# Patient Record
Sex: MALE
[De-identification: names, ages, dates, MRNs, and addresses within clinical notes are randomized per-mention and may not be internally consistent; named-entity substitution may affect disease eponyms.]

---

## 2018-06-21 ENCOUNTER — HOSPITAL ENCOUNTER (EMERGENCY)
Dept: HOSPITAL 5 - ED | Age: 48
Discharge: HOME | End: 2018-06-21
Payer: COMMERCIAL

## 2018-06-21 VITALS — DIASTOLIC BLOOD PRESSURE: 74 MMHG | SYSTOLIC BLOOD PRESSURE: 108 MMHG

## 2018-06-21 DIAGNOSIS — R94.31: ICD-10-CM

## 2018-06-21 DIAGNOSIS — R20.0: Primary | ICD-10-CM

## 2018-06-21 LAB
APTT BLD: 29.6 SEC. (ref 24.2–36.6)
BASOPHILS # (AUTO): 0 K/MM3 (ref 0–0.1)
BASOPHILS NFR BLD AUTO: 0.3 % (ref 0–1.8)
BUN SERPL-MCNC: 11 MG/DL (ref 9–20)
BUN/CREAT SERPL: 14 %
CALCIUM SERPL-MCNC: 8.8 MG/DL (ref 8.4–10.2)
EOSINOPHIL # BLD AUTO: 0.2 K/MM3 (ref 0–0.4)
EOSINOPHIL NFR BLD AUTO: 3.7 % (ref 0–4.3)
HCT VFR BLD CALC: 43.3 % (ref 35.5–45.6)
HEMOLYSIS INDEX: 14
HGB BLD-MCNC: 14.3 GM/DL (ref 11.8–15.2)
INR PPP: 0.89 (ref 0.87–1.13)
LYMPHOCYTES # BLD AUTO: 1.6 K/MM3 (ref 1.2–5.4)
LYMPHOCYTES NFR BLD AUTO: 24.7 % (ref 13.4–35)
MCH RBC QN AUTO: 26 PG (ref 28–32)
MCHC RBC AUTO-ENTMCNC: 33 % (ref 32–34)
MCV RBC AUTO: 80 FL (ref 84–94)
MONOCYTES # (AUTO): 0.4 K/MM3 (ref 0–0.8)
MONOCYTES % (AUTO): 5.9 % (ref 0–7.3)
PLATELET # BLD: 260 K/MM3 (ref 140–440)
RBC # BLD AUTO: 5.4 M/MM3 (ref 3.65–5.03)

## 2018-06-21 PROCEDURE — 80048 BASIC METABOLIC PNL TOTAL CA: CPT

## 2018-06-21 PROCEDURE — 85730 THROMBOPLASTIN TIME PARTIAL: CPT

## 2018-06-21 PROCEDURE — 85610 PROTHROMBIN TIME: CPT

## 2018-06-21 PROCEDURE — 84484 ASSAY OF TROPONIN QUANT: CPT

## 2018-06-21 PROCEDURE — 36415 COLL VENOUS BLD VENIPUNCTURE: CPT

## 2018-06-21 PROCEDURE — 93010 ELECTROCARDIOGRAM REPORT: CPT

## 2018-06-21 PROCEDURE — 93005 ELECTROCARDIOGRAM TRACING: CPT

## 2018-06-21 PROCEDURE — 85025 COMPLETE CBC W/AUTO DIFF WBC: CPT

## 2018-06-21 NOTE — CONSULTATION
History of Present Illness


Consult date: 06/21/18


Consult reason: chest pain


History of present illness: 





The patient is a 48-year-old man referred to cardiology for further evaluation 

of abnormal EKG.  The patient is otherwise healthy, no medical history, no 

prior cardiac history.  Today, he went to his primary care doctor for the first 

time, ostensibly for a routine visit to establish routine care.  During review 

of systems, he states that he mentioned that he occasionally gets numbness in 

the left hand which he attributed to the fact that he is constantly typing on 

the computer.





He states that his PCP immediately begun EKG, and based on that referred him to 

go immediately to the emergency room.  The patient denies chest pain, no 

shortness of breath, no palpitations, no edema.  He otherwise looks and feels 

well.  His ECG was repeated in the emergency room a cardiac consultation was 

requested.





I reviewed the ECG from the emergency room and the earlier ECG from the PCPs 

office.  Both ECGs show a normal sinus rhythm, normal ECG with no significant 

ST abnormalities.  On the earlier ECG from the PCP office, there is some 

baseline artifact, but otherwise normal.





Past History


Past Medical History: No medical history





Medications and Allergies


 Allergies











Allergy/AdvReac Type Severity Reaction Status Date / Time


 


No Known Allergies Allergy   Unverified 06/21/18 11:40














Review of Systems


Cardiovascular: no chest pain, no orthopnea, no palpitations, no rapid/

irregular heart beat, no edema, no syncope, no lightheadedness, no shortness of 

breath





Physical Examination


 Vital Signs











Temp Pulse Resp BP Pulse Ox


 


 97.9 F   70   18   132/89   99 


 


 06/21/18 11:41  06/21/18 11:41  06/21/18 11:41  06/21/18 11:41  06/21/18 11:41











General appearance: no acute distress


HEENT: Positive: PERRL


Neck: Positive: neck supple


Lungs: Positive: Normal Exam


Neuro: Positive: Grossly Intact


Abdomen: Positive: Soft


Male genitourinary: Positive: deferred


Skin: Positive: Clear


Extremities: Absent: edema





Results





 06/21/18 12:04





 06/21/18 12:04


 Coagulation











  06/21/18 Range/Units





  14:13 


 


PT  12.5  (12.2-14.9)  Sec.


 


INR  0.89  (0.87-1.13)  


 


APTT  29.6  (24.2-36.6)  Sec.








 CBC











  06/21/18 Range/Units





  12:04 


 


WBC  6.5  (4.5-11.0)  K/mm3


 


RBC  5.40 H  (3.65-5.03)  M/mm3


 


Hgb  14.3  (11.8-15.2)  gm/dl


 


Hct  43.3  (35.5-45.6)  %


 


Plt Count  260  (140-440)  K/mm3


 


Lymph #  1.6  (1.2-5.4)  K/mm3


 


Mono #  0.4  (0.0-0.8)  K/mm3


 


Eos #  0.2  (0.0-0.4)  K/mm3


 


Baso #  0.0  (0.0-0.1)  K/mm3








 Comprehensive Metabolic Panel











  06/21/18 Range/Units





  12:04 


 


Sodium  137  (137-145)  mmol/L


 


Potassium  4.3  (3.6-5.0)  mmol/L


 


Chloride  100.4  ()  mmol/L


 


Carbon Dioxide  25  (22-30)  mmol/L


 


BUN  11  (9-20)  mg/dL


 


Creatinine  0.8  (0.8-1.5)  mg/dL


 


Glucose  92  ()  mg/dL


 


Calcium  8.8  (8.4-10.2)  mg/dL














EKG interpretations





- Telemetry


EKG Rhythm: Sinus Rhythm





Assessment and Plan





- Patient Problems


(1) Abnormal ECG


Current Visit: Yes   Status: Acute   


Plan to address problem: 


Patient has no cardiac symptoms, essentially asymptomatic, looks and feels well

, serial ECGs are normal.  There is no indication for any further cardiac 

workup or hospital admission at this time.  Patient is stable for cardiac 

discharge from the emergency room.

## 2018-06-21 NOTE — EMERGENCY DEPARTMENT REPORT
ED General Adult HPI





- General


Chief complaint: Recheck/Abnormal Lab/Rx


Stated complaint: ABDORMAL EKG


Time Seen by Provider: 06/21/18 13:56


Source: patient


Mode of arrival: Ambulatory


Limitations: No Limitations





- History of Present Illness


Initial comments: 





Patient was told to go to the ED by his primary doctor for an evaluation of 

"abnormal EKG".


MD Complaint: Left Upper extremity numbness on and off.


-: Gradual


Location: left, upper extremity


Radiation: non-radiation


Severity scale (0 -10): 2


Quality: other (numb)


Consistency: intermittent


Improves with: none


Worsens with: none


Associated Symptoms: denies other symptoms


Treatments Prior to Arrival: none





- Related Data


 Allergies











Allergy/AdvReac Type Severity Reaction Status Date / Time


 


No Known Allergies Allergy   Unverified 06/21/18 11:40














ED Review of Systems


ROS: 


Stated complaint: ABDORMAL EKG


Other details as noted in HPI





Comment: All other systems reviewed and negative


Constitutional: denies: chills, fever


Eyes: denies: eye pain


ENT: denies: ear pain


Respiratory: denies: cough, shortness of breath


Cardiovascular: denies: chest pain, palpitations, dyspnea on exertion, edema, 

syncope


Endocrine: no symptoms reported


Gastrointestinal: denies: abdominal pain, nausea, vomiting, diarrhea


Genitourinary: denies: urgency, dysuria, frequency


Musculoskeletal: denies: back pain, joint swelling


Skin: denies: rash, change in color


Neurological: numbness (LUE).  denies: headache, weakness


Psychiatric: denies: anxiety, depression


Hematological/Lymphatic: denies: easy bleeding, easy bruising





ED Past Medical Hx





- Past Medical History


Previous Medical History?: No





- Surgical History


Past Surgical History?: No





- Social History


Smoking Status: Former Smoker


Substance Use Type: Alcohol





ED Physical Exam





- General


Limitations: No Limitations


General appearance: alert, in no apparent distress





- Head


Head exam: Present: atraumatic, normocephalic, normal inspection





- Eye


Eye exam: Present: normal appearance, PERRL, EOMI


Pupils: Present: normal accommodation





- ENT


ENT exam: Present: normal exam, normal orophraynx, mucous membranes dry





- Neck


Neck exam: Present: normal inspection, full ROM.  Absent: tenderness





- Respiratory


Respiratory exam: Present: normal lung sounds bilaterally.  Absent: respiratory 

distress, wheezes, rales, rhonchi





- Cardiovascular


Cardiovascular Exam: Present: regular rate, normal rhythm, normal heart sounds





- GI/Abdominal


GI/Abdominal exam: Present: soft, distended, normal bowel sounds.  Absent: 

tenderness, guarding, rebound, rigid





- Extremities Exam


Extremities exam: Present: normal inspection, full ROM, normal capillary refill





- Back Exam


Back exam: Present: normal inspection, full ROM.  Absent: tenderness





- Neurological Exam


Neurological exam: Present: alert, oriented X3, CN II-XII intact





- Psychiatric


Psychiatric exam: Present: normal affect, normal mood





- Skin


Skin exam: Present: warm, dry, intact, normal color





ED Course


 Vital Signs











  06/21/18





  11:41


 


Temperature 97.9 F


 


Pulse Rate 70


 


Respiratory 18





Rate 


 


Blood Pressure 132/89


 


O2 Sat by Pulse 99





Oximetry 














- Reevaluation(s)


Reevaluation #1: 





06/21/18 15:49


I consulted the Cardiologist on call Dr BARRINGTON Yeh. He came to the ED and 

evaluated patient and read the EKG. He recommend discharging patient home to 

follow up at his clinic for an out patient stress test.





ED Medical Decision Making





- Lab Data


Result diagrams: 


 06/21/18 12:04





 06/21/18 12:04





- EKG Data


-: EKG Interpreted by Me


EKG shows normal: sinus rhythm


Rate: normal (71)





- EKG Data


When compared to previous EKG there are: previous EKG unavailable


Interpretation: other (No STEMI. EKG was read as normal by Dr Yeh the 

Cardiologist on call.)





- Radiology Data


Radiology results: report reviewed





- Medical Decision Making





Left Upper Extremity Numbness.


Critical care attestation.: 


If time is entered above; I have spent that time in minutes in the direct care 

of this critically ill patient, excluding procedure time.








ED Disposition


Clinical Impression: 


 Left upper extremity numbness





Disposition: DC-01 TO HOME OR SELFCARE


Is pt being admited?: No


Does the pt Need Aspirin: No


Condition: Stable


Instructions:  Paresthesia (ED)


Additional Instructions: 


Please follow up with the Cardiologist Dr BARRINGTON Yeh for an out patient stress 

test. Return to the ED if your condition worsens.


Referrals: 


CARLITOS KHOURY MD [Primary Care Provider] - 3-5 Days


HEAVENLY YEH MD [Staff Physician] - 3-5 Days


Time of Disposition: 16:01